# Patient Record
Sex: FEMALE | Race: WHITE | NOT HISPANIC OR LATINO | URBAN - METROPOLITAN AREA
[De-identification: names, ages, dates, MRNs, and addresses within clinical notes are randomized per-mention and may not be internally consistent; named-entity substitution may affect disease eponyms.]

---

## 2021-04-07 ENCOUNTER — APPOINTMENT (RX ONLY)
Dept: URBAN - METROPOLITAN AREA CLINIC 312 | Facility: CLINIC | Age: 46
Setting detail: DERMATOLOGY
End: 2021-04-07

## 2021-04-07 DIAGNOSIS — L40.0 PSORIASIS VULGARIS: ICD-10-CM

## 2021-04-07 PROCEDURE — 99214 OFFICE O/P EST MOD 30 MIN: CPT

## 2021-04-07 PROCEDURE — ? ADDITIONAL NOTES

## 2021-04-07 PROCEDURE — ? PRESCRIPTION

## 2021-04-07 PROCEDURE — ? PRESCRIPTION MEDICATION MANAGEMENT

## 2021-04-07 PROCEDURE — ? COUNSELING

## 2021-04-07 PROCEDURE — ? TREATMENT REGIMEN

## 2021-04-07 RX ORDER — HALOBETASOL PROPIONATE 0.5 MG/G
OINTMENT TOPICAL
Qty: 1 | Refills: 1 | Status: ERX | COMMUNITY
Start: 2021-04-07

## 2021-04-07 RX ADMIN — HALOBETASOL PROPIONATE 1: 0.5 OINTMENT TOPICAL at 00:00

## 2021-04-07 NOTE — HPI: RASH (PSORIASIS)
How Severe Is Your Psoriasis?: mild
Do You Have A Family History Of Psoriasis?: no
Is This A New Presentation, Or A Follow-Up?: Follow Up Psoriasis
Additional History: Pt is here for RF on her Halbetasol 0.05% oint. Pt states condition has gotten slightly worse.

## 2021-04-07 NOTE — PROCEDURE: PRESCRIPTION MEDICATION MANAGEMENT
Render In Strict Bullet Format?: No
Plan: Diagnosis reviewed with the patient. Patient advised to consult with a rheumatologist for evaluation of any joint pain. Discussed common comorbidities of psoriasis including: elevated blood pressure, hyperlipidemia, obesity, diabetes, heart disease. Discussed importance of regular follow-up with a PCP. Discussed that psoriasis has no cure and treatment is aimed at control. Tx options include topicals (steriods, tazorac, calcipotriene, coal tar, etc), UV treatment, orals (MTX, retinoids, otezla), and biologics.  Pt had many questions regarding options.\\n\\nDisc Otezla.  Discussed potential s/e including headache, GI upset (change in appetite, wt loss, n/v, diarrhea, abd pain, etc), URI and depression.   Tb testing and laboratory testing is not nec with this medication.  \\n  \\nDisc biologics in regards to the medication itself, mechanism of action, treatment course, and potential side effects.  Pt aware of an increased risk of serious, chronic or recurring infections, injection site reactions, hematologic abnormalities, malignancy, lymphoma, reactivation of latent TB, serious sometimes fatal infections, exacerbation or new onset demyelinating disease (ie. MS, guillain barre), reactivation of Hepatitis B, hepatotoxicity, cytopenias, lupus-like symptoms.  Pt verbalizes understanding. All associated questions addressed.  \\n
Detail Level: Zone

## 2022-04-04 ENCOUNTER — RX ONLY (OUTPATIENT)
Age: 47
Setting detail: RX ONLY
End: 2022-04-04

## 2022-04-04 RX ORDER — HALOBETASOL PROPIONATE 0.5 MG/G
OINTMENT TOPICAL
Qty: 50 | Refills: 0 | Status: ERX

## 2022-07-19 ENCOUNTER — RX ONLY (OUTPATIENT)
Age: 47
Setting detail: RX ONLY
End: 2022-07-19

## 2022-07-19 RX ORDER — HALOBETASOL PROPIONATE 0.5 MG/G
OINTMENT TOPICAL
Qty: 50 | Refills: 0 | Status: ERX | COMMUNITY
Start: 2022-07-19

## 2022-09-01 ENCOUNTER — APPOINTMENT (RX ONLY)
Dept: URBAN - METROPOLITAN AREA CLINIC 312 | Facility: CLINIC | Age: 47
Setting detail: DERMATOLOGY
End: 2022-09-01

## 2022-09-01 DIAGNOSIS — L40.0 PSORIASIS VULGARIS: ICD-10-CM

## 2022-09-01 PROCEDURE — ? COUNSELING

## 2022-09-01 PROCEDURE — 99214 OFFICE O/P EST MOD 30 MIN: CPT

## 2022-09-01 PROCEDURE — ? PRESCRIPTION MEDICATION MANAGEMENT

## 2022-09-01 ASSESSMENT — LOCATION DETAILED DESCRIPTION DERM
LOCATION DETAILED: LEFT DORSAL WRIST
LOCATION DETAILED: LEFT ANKLE
LOCATION DETAILED: RIGHT ANKLE
LOCATION DETAILED: RIGHT KNEE
LOCATION DETAILED: LEFT KNEE
LOCATION DETAILED: RIGHT RADIAL DORSAL HAND

## 2022-09-01 ASSESSMENT — LOCATION ZONE DERM
LOCATION ZONE: ARM
LOCATION ZONE: HAND
LOCATION ZONE: LEG

## 2022-09-01 ASSESSMENT — LOCATION SIMPLE DESCRIPTION DERM
LOCATION SIMPLE: LEFT KNEE
LOCATION SIMPLE: RIGHT ANKLE
LOCATION SIMPLE: LEFT ANKLE
LOCATION SIMPLE: RIGHT KNEE
LOCATION SIMPLE: LEFT WRIST
LOCATION SIMPLE: RIGHT HAND

## 2022-09-01 NOTE — HPI: PSORIASIS (PATIENT REPORTED)
Do You Have A Family History Of Psoriasis?: no
Where Is Your Psoriasis Located?: Feet, elbows.
List Prescription Topical Steroids You Tried (Separate Each Name With A Comma):: Halobetasol
Additional History: Pt is here for check up on psoriasis. She wants to discuss a different treatment as she does not feel like she is very good at following the instructions on reapplying.

## 2022-09-01 NOTE — PROCEDURE: PRESCRIPTION MEDICATION MANAGEMENT
Continue Regimen: HALBETASOL BID S 2-3WKS PER FLARE.
Detail Level: Zone
Plan: Diagnosis reviewed with the patient. Patient advised to consult with a rheumatologist for evaluation of any joint pain. Discussed common comorbidities of psoriasis including: elevated blood pressure, hyperlipidemia, obesity, diabetes, heart disease. Discussed importance of regular follow-up with a PCP. Discussed that psoriasis has no cure and treatment is aimed at control. Tx options include topicals (steriods, tazorac, calcipotriene, coal tar, etc), UV treatment, orals (MTX, retinoids, otezla), and biologics.  Pt had many questions regarding options.\\n\\nDisc Otezla.  Discussed potential s/e including headache, GI upset (change in appetite, wt loss, n/v, diarrhea, abd pain, etc), URI and depression.   Tb testing and laboratory testing is not nec with this medication.  \\n  \\nDisc biologics in regards to the medication itself, mechanism of action, treatment course, and potential side effects.  Pt aware of an increased risk of serious, chronic or recurring infections, injection site reactions, hematologic abnormalities, malignancy, lymphoma, reactivation of latent TB, serious sometimes fatal infections, exacerbation or new onset demyelinating disease (ie. MS, guillain barre), reactivation of Hepatitis B, hepatotoxicity, cytopenias, lupus-like symptoms.  Pt verbalizes understanding. All associated questions addressed.  \\n\\nPT HAS HX OF GI ISSUES, H/A, ETC AND WANTS TO AVOID OTEZLA.  \\n\\nDOES NOT WANT TO BE ON BIOLOGICS.\\n\\nDID DISCUSS NARROW BAND UVB LIGHT TX.  PT PROVIDED WITH ALL ASSOCIATED INFORMATION.\\n
Render In Strict Bullet Format?: No

## 2022-10-31 ENCOUNTER — RX ONLY (OUTPATIENT)
Age: 47
Setting detail: RX ONLY
End: 2022-10-31

## 2022-10-31 RX ORDER — HALOBETASOL PROPIONATE 0.5 MG/G
OINTMENT TOPICAL
Qty: 50 | Refills: 0 | Status: ERX

## 2023-01-25 ENCOUNTER — RX ONLY (OUTPATIENT)
Age: 48
Setting detail: RX ONLY
End: 2023-01-25

## 2023-01-25 RX ORDER — HALOBETASOL PROPIONATE 0.5 MG/G
OINTMENT TOPICAL
Qty: 50 | Refills: 0 | Status: ERX

## 2023-04-19 ENCOUNTER — RX ONLY (OUTPATIENT)
Age: 48
Setting detail: RX ONLY
End: 2023-04-19

## 2023-04-19 RX ORDER — HALOBETASOL PROPIONATE 0.5 MG/G
1 OINTMENT TOPICAL BID
Qty: 50 | Refills: 2 | Status: ERX

## 2024-03-25 ENCOUNTER — APPOINTMENT (RX ONLY)
Dept: URBAN - METROPOLITAN AREA CLINIC 312 | Facility: CLINIC | Age: 49
Setting detail: DERMATOLOGY
End: 2024-03-25

## 2024-03-25 DIAGNOSIS — L40.0 PSORIASIS VULGARIS: ICD-10-CM | Status: INADEQUATELY CONTROLLED

## 2024-03-25 PROCEDURE — ? NARROW BAND UVB ORDER

## 2024-03-25 PROCEDURE — ? PRESCRIPTION

## 2024-03-25 PROCEDURE — 99214 OFFICE O/P EST MOD 30 MIN: CPT

## 2024-03-25 PROCEDURE — ? PRESCRIPTION MEDICATION MANAGEMENT

## 2024-03-25 PROCEDURE — ? COUNSELING

## 2024-03-25 RX ORDER — TAPINAROF 10 MG/1000MG
CREAM TOPICAL
Qty: 60 | Refills: 11 | Status: ERX | COMMUNITY
Start: 2024-03-25

## 2024-03-25 RX ADMIN — TAPINAROF: 10 CREAM TOPICAL at 00:00

## 2024-03-25 ASSESSMENT — LOCATION DETAILED DESCRIPTION DERM
LOCATION DETAILED: LEFT DISTAL LATERAL CALF
LOCATION DETAILED: LEFT PROXIMAL DORSAL FOREARM
LOCATION DETAILED: LEFT LATERAL DORSAL FOOT
LOCATION DETAILED: LEFT MEDIAL FRONTAL SCALP
LOCATION DETAILED: RIGHT KNEE
LOCATION DETAILED: LEFT ELBOW
LOCATION DETAILED: LEFT KNEE
LOCATION DETAILED: RIGHT ELBOW

## 2024-03-25 ASSESSMENT — LOCATION SIMPLE DESCRIPTION DERM
LOCATION SIMPLE: RIGHT KNEE
LOCATION SIMPLE: LEFT FOOT
LOCATION SIMPLE: LEFT ELBOW
LOCATION SIMPLE: LEFT SCALP
LOCATION SIMPLE: LEFT CALF
LOCATION SIMPLE: LEFT KNEE
LOCATION SIMPLE: RIGHT ELBOW
LOCATION SIMPLE: LEFT FOREARM

## 2024-03-25 ASSESSMENT — BSA PSORIASIS: % BODY COVERED IN PSORIASIS: 10

## 2024-03-25 ASSESSMENT — LOCATION ZONE DERM
LOCATION ZONE: FEET
LOCATION ZONE: SCALP
LOCATION ZONE: LEG
LOCATION ZONE: ARM

## 2024-03-25 NOTE — PROCEDURE: PRESCRIPTION MEDICATION MANAGEMENT
Plan: .\\nDiscussed treatment options for psoriasis. Pt has tried topical steroids (clobetasol, halobetasol) and Zoryve cream with minimal improvement. She does endorse some mild joint pain in elbows and ankles. We discussed injectable biologics but pt a little hesitant about this and would like to try other options first. Will have her try Vtama and light therapy. If no improvement, consider discussing injectable at that time.
Initiate Treatment: VTAMA 1% cream QD
Render In Strict Bullet Format?: No
Detail Level: Simple
Samples Given: Vtama

## 2024-05-21 ENCOUNTER — APPOINTMENT (RX ONLY)
Dept: URBAN - METROPOLITAN AREA CLINIC 312 | Facility: CLINIC | Age: 49
Setting detail: DERMATOLOGY
End: 2024-05-21

## 2024-05-21 ENCOUNTER — RX ONLY (OUTPATIENT)
Age: 49
Setting detail: RX ONLY
End: 2024-05-21

## 2024-05-21 DIAGNOSIS — L40.0 PSORIASIS VULGARIS: ICD-10-CM

## 2024-05-21 PROCEDURE — ? ORDER TESTS

## 2024-05-21 PROCEDURE — ? ADDITIONAL NOTES

## 2024-05-21 PROCEDURE — ? PRESCRIPTION MEDICATION MANAGEMENT

## 2024-05-21 PROCEDURE — ? SKYRIZI INITIATION

## 2024-05-21 PROCEDURE — ? COUNSELING

## 2024-05-21 PROCEDURE — 99214 OFFICE O/P EST MOD 30 MIN: CPT

## 2024-05-21 RX ORDER — RISANKIZUMAB-RZAA 150 MG/ML
INJECTION SUBCUTANEOUS
Qty: 2 | Refills: 3 | Status: ERX | COMMUNITY
Start: 2024-05-21

## 2024-05-21 ASSESSMENT — LOCATION ZONE DERM
LOCATION ZONE: SCALP
LOCATION ZONE: FEET
LOCATION ZONE: LEG
LOCATION ZONE: ARM

## 2024-05-21 ASSESSMENT — LOCATION SIMPLE DESCRIPTION DERM
LOCATION SIMPLE: LEFT KNEE
LOCATION SIMPLE: LEFT SCALP
LOCATION SIMPLE: RIGHT KNEE
LOCATION SIMPLE: RIGHT ELBOW
LOCATION SIMPLE: LEFT FOOT
LOCATION SIMPLE: LEFT CALF
LOCATION SIMPLE: LEFT ELBOW

## 2024-05-21 ASSESSMENT — LOCATION DETAILED DESCRIPTION DERM
LOCATION DETAILED: RIGHT KNEE
LOCATION DETAILED: LEFT DISTAL LATERAL CALF
LOCATION DETAILED: LEFT MEDIAL FRONTAL SCALP
LOCATION DETAILED: LEFT LATERAL DORSAL FOOT
LOCATION DETAILED: LEFT KNEE
LOCATION DETAILED: RIGHT ELBOW
LOCATION DETAILED: LEFT ELBOW

## 2024-05-21 NOTE — PROCEDURE: ADDITIONAL NOTES
Additional Notes: Sent in Skyrizi today to [facility] pharmacy
Detail Level: Simple
Render Risk Assessment In Note?: no

## 2024-05-21 NOTE — PROCEDURE: SKYRIZI INITIATION
Skyrizi Monitoring Guidelines: A yearly test for tuberculosis is required while taking Skyrizi.
Methotrexate Specific Contraindications (Override- The Patient.....): history of Breast Cancer
Add Pregnancy And Lactation Warning To Medication Counseling?: No
Phototherapy Specific Contraindications (Override- The Patient.....): has extensive Actinic damage and high risk for skin cancer
Is Cyclosporine Contraindicated?: Yes
Detail Level: Zone
Skyrizi Dosing: 150 mg SC week 0 and week 4 then every 12 weeks thereafter
Bsa (10% Or More): 20
Include Weight Question: Yes - Pounds
Diagnosis (Required): Psoriasis
Pga/Iga Score: 4
Pregnancy And Lactation Warning Text: The risk during pregnancy and breastfeeding is uncertain with this medication.

## 2024-05-21 NOTE — PROCEDURE: ORDER TESTS
Billing Type: Third-Party Bill
Performing Laboratory: 0
Bill For Surgical Tray: no
Expected Date Of Service: 05/21/2024

## 2024-05-21 NOTE — PROCEDURE: PRESCRIPTION MEDICATION MANAGEMENT
Continue Regimen: VTAMA 1% cream QD
Plan: Discussed treatment options for psoriasis. Pt has tried topical steroids (clobetasol, halobetasol), Zoryve cream, and Vtama with minimal improvement. She does endorse some mild joint pain in elbows and ankles. We discussed injectable biologics, pt is willing to start SkyRizzi today.
Render In Strict Bullet Format?: No
Detail Level: Simple

## 2024-07-30 ENCOUNTER — APPOINTMENT (RX ONLY)
Dept: URBAN - METROPOLITAN AREA CLINIC 312 | Facility: CLINIC | Age: 49
Setting detail: DERMATOLOGY
End: 2024-07-30

## 2024-07-30 DIAGNOSIS — L40.0 PSORIASIS VULGARIS: ICD-10-CM | Status: INADEQUATELY CONTROLLED

## 2024-07-30 PROCEDURE — ? SKYRIZI INITIATION

## 2024-07-30 PROCEDURE — ? PRESCRIPTION MEDICATION MANAGEMENT

## 2024-07-30 PROCEDURE — ? PRESCRIPTION

## 2024-07-30 PROCEDURE — ? ADDITIONAL NOTES

## 2024-07-30 PROCEDURE — ? MDM - TREATMENT GOALS

## 2024-07-30 PROCEDURE — ? COUNSELING

## 2024-07-30 PROCEDURE — 99214 OFFICE O/P EST MOD 30 MIN: CPT

## 2024-07-30 RX ORDER — RISANKIZUMAB-RZAA 150 MG/ML
INJECTION SUBCUTANEOUS
Qty: 2 | Refills: 9 | Status: ERX

## 2024-07-30 ASSESSMENT — LOCATION SIMPLE DESCRIPTION DERM
LOCATION SIMPLE: LEFT CALF
LOCATION SIMPLE: LEFT KNEE
LOCATION SIMPLE: RIGHT ELBOW
LOCATION SIMPLE: LEFT ELBOW
LOCATION SIMPLE: LEFT SCALP
LOCATION SIMPLE: LEFT FOOT
LOCATION SIMPLE: RIGHT KNEE

## 2024-07-30 ASSESSMENT — LOCATION ZONE DERM
LOCATION ZONE: FEET
LOCATION ZONE: ARM
LOCATION ZONE: LEG
LOCATION ZONE: SCALP

## 2024-07-30 ASSESSMENT — LOCATION DETAILED DESCRIPTION DERM
LOCATION DETAILED: RIGHT ELBOW
LOCATION DETAILED: LEFT DISTAL LATERAL CALF
LOCATION DETAILED: LEFT ELBOW
LOCATION DETAILED: LEFT LATERAL DORSAL FOOT
LOCATION DETAILED: RIGHT KNEE
LOCATION DETAILED: LEFT MEDIAL FRONTAL SCALP
LOCATION DETAILED: LEFT KNEE

## 2024-07-30 NOTE — PROCEDURE: PRESCRIPTION MEDICATION MANAGEMENT
Continue Regimen: VTAMA 1% cream QD
Plan: Discussed treatment options for psoriasis. Pt has tried topical steroids (clobetasol, halobetasol), Zoryve cream, and Vtama with minimal improvement. She does endorse some mild joint pain in elbows and ankles. We discussed injectable biologics, pt is willing to start SkyRizzi today.
Initiate Treatment: Skyrizi 150 mg/mL subcutaneous pen injector-Inject one subcutaneous pen injector into either thigh or abdomen (4 inches away from navel) 4 weeks from 7/30/2024 then once every 3 months
Render In Strict Bullet Format?: No
Detail Level: Simple

## 2024-07-30 NOTE — PROCEDURE: ADDITIONAL NOTES
Additional Notes: Sent in Skyrizi today to [facility] pharmacy
Detail Level: Simple
Render Risk Assessment In Note?: no

## 2024-08-27 ENCOUNTER — APPOINTMENT (RX ONLY)
Dept: URBAN - METROPOLITAN AREA CLINIC 312 | Facility: CLINIC | Age: 49
Setting detail: DERMATOLOGY
End: 2024-08-27

## 2024-08-27 ENCOUNTER — RX ONLY (OUTPATIENT)
Age: 49
Setting detail: RX ONLY
End: 2024-08-27

## 2024-08-27 DIAGNOSIS — L40.0 PSORIASIS VULGARIS: ICD-10-CM

## 2024-08-27 PROCEDURE — ? PRESCRIPTION MEDICATION MANAGEMENT

## 2024-08-27 PROCEDURE — 96372 THER/PROPH/DIAG INJ SC/IM: CPT

## 2024-08-27 PROCEDURE — ? SKYRIZI INJECTION

## 2024-08-27 PROCEDURE — ? MDM - TREATMENT GOALS

## 2024-08-27 RX ORDER — RISANKIZUMAB-RZAA 150 MG/ML
INJECTION SUBCUTANEOUS
Qty: 2 | Refills: 9 | Status: ERX

## 2024-08-27 ASSESSMENT — LOCATION DETAILED DESCRIPTION DERM: LOCATION DETAILED: PERIUMBILICAL SKIN

## 2024-08-27 ASSESSMENT — LOCATION SIMPLE DESCRIPTION DERM: LOCATION SIMPLE: ABDOMEN

## 2024-08-27 ASSESSMENT — LOCATION ZONE DERM: LOCATION ZONE: TRUNK

## 2024-08-27 NOTE — PROCEDURE: PRESCRIPTION MEDICATION MANAGEMENT
Continue Regimen: Skyrizi 150 mg/mL subcutaneous pen injector-Inject one subcutaneous pen injector into either thigh or abdomen (4 inches away from navel) once every 3 months\\n\\nVTAMA 1% cream QD
Plan: Discussed treatment options for psoriasis. Pt has tried topical steroids (clobetasol, halobetasol), Zoryve cream, and Vtama with minimal improvement. She does endorse some mild joint pain in elbows and ankles. We discussed injectable biologics, pt is willing to start SkyRizzi today.
Render In Strict Bullet Format?: No
Detail Level: Simple

## 2024-08-27 NOTE — PROCEDURE: SKYRIZI INJECTION
Use Enhanced Ndc?: Yes
Syringe Size Used (Required For Enhanced Ndc): 150 mg/ml Prefilled Syringe
Lot # (Optional): [id number]
Administered By (Optional): Delphine Campos MA
Ndc (75mg/0.83ml Syringe): [address]-03
Consent: The risks of pain and injection site reactions were reviewed with the patient prior to the injection.
Ndc (150mg/Ml Syringe): 0845-7130-48
Hide Non-Enhanced Ndc Variable: No
Expiration Date (Optional): OCT 2025
Treatment Number (Optional): 2
[id number] Billing Preferences: By Number of Body Touches
J-Code: 
Skyrizi Amount: 150 mg
Additional Comments: NDC 9104-0460-43
Detail Level: None
Date Of Next Injection: 12 Weeks

## 2024-12-11 ENCOUNTER — APPOINTMENT (OUTPATIENT)
Dept: URBAN - METROPOLITAN AREA CLINIC 373 | Facility: CLINIC | Age: 49
Setting detail: DERMATOLOGY
End: 2024-12-11

## 2025-01-14 ENCOUNTER — RX ONLY (RX ONLY)
Age: 50
End: 2025-01-14

## 2025-01-14 RX ORDER — RISANKIZUMAB-RZAA 150 MG/ML
INJECTION SUBCUTANEOUS
Qty: 2 | Refills: 9 | Status: ERX

## 2025-01-17 ENCOUNTER — RX ONLY (RX ONLY)
Age: 50
End: 2025-01-17

## 2025-01-17 RX ORDER — RISANKIZUMAB-RZAA 150 MG/ML
INJECTION SUBCUTANEOUS
Qty: 2 | Refills: 9 | Status: ERX

## 2025-06-12 ENCOUNTER — APPOINTMENT (OUTPATIENT)
Dept: URBAN - METROPOLITAN AREA CLINIC 312 | Facility: CLINIC | Age: 50
Setting detail: DERMATOLOGY
End: 2025-06-12

## 2025-06-12 DIAGNOSIS — Z02.9 ENCOUNTER FOR ADMINISTRATIVE EXAMINATIONS, UNSPECIFIED: ICD-10-CM
